# Patient Record
Sex: MALE | Race: WHITE
[De-identification: names, ages, dates, MRNs, and addresses within clinical notes are randomized per-mention and may not be internally consistent; named-entity substitution may affect disease eponyms.]

---

## 2021-06-13 ENCOUNTER — HOSPITAL ENCOUNTER (EMERGENCY)
Dept: HOSPITAL 50 - VM.ED | Age: 26
Discharge: HOME | End: 2021-06-13
Payer: COMMERCIAL

## 2021-06-13 DIAGNOSIS — Z91.048: ICD-10-CM

## 2021-06-13 DIAGNOSIS — Z46.89: Primary | ICD-10-CM

## 2021-06-13 NOTE — EDM.PDOC
ED HPI GENERAL MEDICAL PROBLEM





- General


Chief Complaint: Lower Extremity Injury/Pain


Stated Complaint: RT FOOT TURNING PURPLE


Time Seen by Provider: 06/13/21 14:00


Source of Information: Reports: Other (staff from group home)


History Limitations: Reports: Language Barrier





- History of Present Illness


INITIAL COMMENTS - FREE TEXT/NARRATIVE: 





Patient lives at group home.  Did sustain a fracture to the right foot about two

weeks ago.  Was seen by podiatry and casted.  He routinely tries to walk on the 

cast.  was doing well with it prior to shower this morning.  As they were ge

tting him into the shower he had a normal seizure for him.  no known trauma to 

the right leg or ankle, but after shower the staff noted the toes were 

discolored  and purple. They tried to elevate it and look at it but  he would 

not allow anyone to touch the toes.  He is verbal but not with fluent speech.  

They did give him some aspirin for pain earlier today. 


Onset: Today, Sudden


Duration: Hour(s):


Location: Reports: Lower Extremity, Right


Improves with: Reports: None


Worsens with: Reports: None


Associated Symptoms: Reports: No Other Symptoms





- Related Data


                                    Allergies











Allergy/AdvReac Type Severity Reaction Status Date / Time


 


gluten Allergy  Other Verified 06/13/21 14:07











Home Meds: 


                                    Home Meds





Carbamide Peroxide [Debrox 6.5% Otic Soln] 2 drop EARBOTH ASDIRECTED 10/12/18 

[History]


Ibuprofen [Motrin] 400 mg PO Q4H PRN 10/12/18 [History]


Lactase [Lactase Enzyme] 3,000 unit PO TIDMEALS 10/12/18 [History]


Melatonin 3 mg PO BEDTIME 10/12/18 [History]


Methylphenidate HCl [Concerta] 27 mg PO DAILY 10/12/18 [History]


Methylphenidate HCl [Concerta] 36 mg PO DAILY 10/12/18 [History]


Methylphenidate HCl [Ritalin] 5 mg PO DAILY 10/12/18 [History]


Montelukast [Singulair] 5 mg PO DAILY 10/12/18 [History]


Multivitamin [Multi-Vitamin Daily] 1 each PO DAILY 10/12/18 [History]


Triamcinolone Acetonide [Nasacort] 2 sprays NS DAILY 10/12/18 [History]


cloNIDine HCL [Catapres] 0.1 mg PO TID 10/12/18 [History]


lamoTRIgine [Lamictal] 100 mg PO ASDIRECTED 10/12/18 [History]


lamoTRIgine [Lamictal] 200 mg PO BID 10/12/18 [History]











Past Medical History


HEENT History: Reports: Allergic Rhinitis


Gastrointestinal History: Reports: Other (See Below)


Other Gastrointestinal History: encopresis


Musculoskeletal History: Reports: Other (See Below)


Other Musculoskeletal History: kyphoscoliosis


Neurological History: Reports: Seizure, Other (See Below)


Other Neuro History: spasticity


Psychiatric History: Reports: ADHD, Autism, Other (See Below)


Other Psychiatric History: impulse control disorder


Dermatologic History: Reports: Other (See Below)


Other Dermatologic History: acne





Social & Family History





- Tobacco Use


Tobacco Use Status *Q: Never Tobacco User





Review of Systems





- Review of Systems


Review Of Systems: Unable To Obtain


Reason Not Obtained: due to mental capacity





ED EXAM, GENERAL





- Physical Exam


Exam: See Below


Exam Limited By: Language Barrier


General Appearance: Alert, No Apparent Distress


Nose: Normal Inspection


Throat/Mouth: Normal Inspection


Head: Atraumatic


Respiratory/Chest: No Respiratory Distress, Lungs Clear


Cardiovascular: Normal Peripheral Pulses, Regular Rate, Rhythm


Extremities: Other (right ankle with cast in place. capillary refill <3 seconds 

but dark red to purplish discoloration of the toes. )


Neurological: Alert





ED TRAUMA EXTREMITY PROCEDURES





- Splinting


  ** Right Lower Extremity


Pre-Procedure NV Status: Abnormal (prior to removal of cast, resolved with 

removal of prevous cast)


Post-Procedure NV Status: Normal


Splint Material: Fiberglass


Splint Design: Other (short leg cast applied)


Applied & Form Fitted By: Provider


Provider Post-Splint Application NV Check: NV Status Normal


Complications: No





- Additional/Other Procedure(s)


Other (Free Text) Procedure(s): 





cast removal with cast saw.  Tolerated well.  discoloration of the toes improves

 with this. 





Course





- Vital Signs


Last Recorded V/S: 


                                Last Vital Signs











Temp  36.4 C   06/13/21 13:48


 


Pulse  91   06/13/21 13:48


 


Resp  16   06/13/21 13:48


 


BP  103/49 L  06/13/21 13:48


 


Pulse Ox  97   06/13/21 13:48














- Re-Assessments/Exams


Free Text/Narrative Re-Assessment/Exam: 





06/13/21 15:19


cast removed without difficulty.  toes did regain normal color and capillary 

refill is less than 2 sec.  foot is still swollen, cast with added padding 

placed.  tolerated well.  educated on better elevation as possible and follow up

 as planned





Departure





- Departure


Time of Disposition: 14:35


Disposition: Home, Self-Care 01


Clinical Impression: 


 Cast discomfort








- Discharge Information


*PRESCRIPTION DRUG MONITORING PROGRAM REVIEWED*: Not Applicable


*COPY OF PRESCRIPTION DRUG MONITORING REPORT IN PATIENT FABRIZIO: Not Applicable


Instructions:  Cast or Splint Care, Adult, Easy-to-Read


Forms:  ED Department Discharge


Additional Instructions: 


return to the ED for problems as needed.  Work to elevate the leg as much as 

possible and if the toes have discoloration. 





Sepsis Event Note (ED)





- Evaluation


Sepsis Screening Result: No Definite Risk





- Focused Exam


Vital Signs: 


                                   Vital Signs











  Temp Pulse Resp BP Pulse Ox


 


 06/13/21 13:48  36.4 C  91  16  103/49 L  97